# Patient Record
Sex: FEMALE | Race: BLACK OR AFRICAN AMERICAN | NOT HISPANIC OR LATINO | URBAN - METROPOLITAN AREA
[De-identification: names, ages, dates, MRNs, and addresses within clinical notes are randomized per-mention and may not be internally consistent; named-entity substitution may affect disease eponyms.]

---

## 2024-10-01 NOTE — ASU PATIENT PROFILE, ADULT - FALL HARM RISK - UNIVERSAL INTERVENTIONS
Bed in lowest position, wheels locked, appropriate side rails in place/Call bell, personal items and telephone in reach/Instruct patient to call for assistance before getting out of bed or chair/Non-slip footwear when patient is out of bed/[place] to call system/Physically safe environment - no spills, clutter or unnecessary equipment/Purposeful Proactive Rounding/Room/bathroom lighting operational, light cord in reach

## 2024-10-01 NOTE — ASU PATIENT PROFILE, ADULT - NS PREOP UNDERSTANDS INFO
No solid food/dairy/candy/gum after midnight tonight; water allowed before 08:00am tomorrow; patient reminded to come with photo ID/insurance/credit card; dress in comfortable clothes; no jewelries/contact lens/valuable; no smoking/alcohol drinking/recreational drug use today; escort to have photo ID; address and callback number was given/yes

## 2024-10-02 ENCOUNTER — OUTPATIENT (OUTPATIENT)
Dept: OUTPATIENT SERVICES | Facility: HOSPITAL | Age: 39
LOS: 1 days | Discharge: HOME | End: 2024-10-02
Payer: COMMERCIAL

## 2024-10-02 VITALS
WEIGHT: 163.36 LBS | SYSTOLIC BLOOD PRESSURE: 134 MMHG | RESPIRATION RATE: 15 BRPM | HEART RATE: 79 BPM | DIASTOLIC BLOOD PRESSURE: 92 MMHG | HEIGHT: 61 IN | OXYGEN SATURATION: 99 % | TEMPERATURE: 97 F

## 2024-10-02 VITALS
OXYGEN SATURATION: 99 % | HEART RATE: 89 BPM | RESPIRATION RATE: 16 BRPM | DIASTOLIC BLOOD PRESSURE: 75 MMHG | SYSTOLIC BLOOD PRESSURE: 134 MMHG

## 2024-10-02 DIAGNOSIS — D25.9 LEIOMYOMA OF UTERUS, UNSPECIFIED: Chronic | ICD-10-CM

## 2024-10-02 PROCEDURE — 88305 TISSUE EXAM BY PATHOLOGIST: CPT | Mod: 26

## 2024-10-02 PROCEDURE — 19318 BREAST REDUCTION: CPT | Mod: AS,50

## 2024-10-02 RX ORDER — APREPITANT 125MG-80MG
40 KIT ORAL ONCE
Refills: 0 | Status: COMPLETED | OUTPATIENT
Start: 2024-10-02 | End: 2024-10-02

## 2024-10-02 RX ORDER — FENTANYL CITRATE-0.9 % NACL/PF 300MCG/30
50 PATIENT CONTROLLED ANALGESIA VIAL INJECTION
Refills: 0 | Status: DISCONTINUED | OUTPATIENT
Start: 2024-10-02 | End: 2024-10-02

## 2024-10-02 RX ORDER — ACETAMINOPHEN 325 MG
1000 TABLET ORAL ONCE
Refills: 0 | Status: COMPLETED | OUTPATIENT
Start: 2024-10-02 | End: 2024-10-02

## 2024-10-02 RX ORDER — SODIUM CHLORIDE IRRIG SOLUTION 0.9 %
500 SOLUTION, IRRIGATION IRRIGATION ONCE
Refills: 0 | Status: COMPLETED | OUTPATIENT
Start: 2024-10-02 | End: 2024-10-02

## 2024-10-02 RX ORDER — SODIUM CHLORIDE IRRIG SOLUTION 0.9 %
500 SOLUTION, IRRIGATION IRRIGATION
Refills: 0 | Status: DISCONTINUED | OUTPATIENT
Start: 2024-10-02 | End: 2024-10-02

## 2024-10-02 RX ORDER — ONDANSETRON HCL/PF 4 MG/2 ML
4 VIAL (ML) INJECTION ONCE
Refills: 0 | Status: DISCONTINUED | OUTPATIENT
Start: 2024-10-02 | End: 2024-10-02

## 2024-10-02 RX ADMIN — APREPITANT 40 MILLIGRAM(S): KIT at 10:31

## 2024-10-02 RX ADMIN — Medication 400 MILLIGRAM(S): at 17:40

## 2024-10-02 RX ADMIN — Medication 1000 MILLIGRAM(S): at 18:10

## 2024-10-02 RX ADMIN — Medication 2000 MILLILITER(S): at 17:25

## 2024-10-02 NOTE — ASU DISCHARGE PLAN (ADULT/PEDIATRIC) - NS MD DC FALL RISK RISK
For information on Fall & Injury Prevention, visit: [email or web address] OR  [email or web address] OR  https://www.cdc.gov/steadi/patient.html

## 2024-10-02 NOTE — ASU DISCHARGE PLAN (ADULT/PEDIATRIC) - CARE PROVIDER_API CALL
Baljeet Holloway  Plastic Surgery  [address]  [place] [address]  Phone: [phone]  Fax: [phone]  Follow Up Time:

## 2024-10-02 NOTE — BRIEF OPERATIVE NOTE - NSICDXBRIEFPOSTOP_GEN_ALL_CORE_FT
POST-OP DIAGNOSIS:  Hypertrophy of breast 02-Oct-2024 13:35:37  Ping Rudolph  Mastodynia 02-Oct-2024 13:35:45  Ping Rudolph  Acquired deformity of chest and rib 02-Oct-2024 13:35:55  Ping Rudolph

## 2024-10-02 NOTE — BRIEF OPERATIVE NOTE - NSICDXBRIEFPREOP_GEN_ALL_CORE_FT
PRE-OP DIAGNOSIS:  Hypertrophy of breast 02-Oct-2024 13:35:03  Ping Rudolph  Mastodynia 02-Oct-2024 13:35:10  Ping Rudolph  Acquired deformity of chest and rib 02-Oct-2024 13:35:25  Ping Rudolph

## 2024-10-09 LAB — SURGICAL PATHOLOGY STUDY: SIGNIFICANT CHANGE UP

## (undated) DEVICE — DRSG SURGICAL BRA SM 34-36

## (undated) DEVICE — PREP BETADINE KIT

## (undated) DEVICE — STAPLER SKIN MULTI DIRECTION W35

## (undated) DEVICE — DRSG STERISTRIPS 0.5 X 4"

## (undated) DEVICE — PACK BREAST

## (undated) DEVICE — DRSG SURGICAL BRA MED 36-38

## (undated) DEVICE — VENODYNE/SCD SLEEVE CALF MEDIUM

## (undated) DEVICE — Device

## (undated) DEVICE — DRAPE SPLIT SHEET 77" X 108"

## (undated) DEVICE — GLV 7 PROTEXIS (WHITE)

## (undated) DEVICE — SYR LUER LOK 5CC

## (undated) DEVICE — SUT QUILL MONODERM 3-0 14CM 19MM PS-2

## (undated) DEVICE — VENODYNE/SCD SLEEVE CALF LARGE

## (undated) DEVICE — DRAPE TOWEL 1000 SMALL 17" X 11"

## (undated) DEVICE — SUT VICRYL PLUS 2-0 18" TIES UNDYED

## (undated) DEVICE — NDL HYPO REGULAR BEVEL 25G X 1.5" (BLUE)

## (undated) DEVICE — ELCTR PENCIL SMOKE EVACUATOR COATED PUSH BUTTON 70MM

## (undated) DEVICE — SUT QUILL MONODERM 3-0 30CM 19MM

## (undated) DEVICE — STAPLER SKIN VISI-STAT 35 WIDE

## (undated) DEVICE — DRSG KERLIX ROLL 4.5"

## (undated) DEVICE — TUBING MICROAIRE ASPIRATION SET 12FT

## (undated) DEVICE — FOLEY TRAY 16FR 5CC LTX UMETER CLOSED

## (undated) DEVICE — ELCTR STRYKER NEPTUNE BLADE COATED, INSULATED 70MM

## (undated) DEVICE — LAP PAD 18 X 18"

## (undated) DEVICE — TOURNIQUET ESMARK 4"

## (undated) DEVICE — DRSG SURGICAL BRA XL 40-42

## (undated) DEVICE — SUT MONOCRYL 3-0 27" SH

## (undated) DEVICE — SOL IRR POUR NS 0.9% 1000ML

## (undated) DEVICE — WARMING BLANKET LOWER ADULT

## (undated) DEVICE — SUT VICRYL PLUS 1 18" CT-1

## (undated) DEVICE — DRSG ACE BANDAGE 6"

## (undated) DEVICE — DRSG DERMABOND PRINEO 60CM

## (undated) DEVICE — MARKING PEN W RULER

## (undated) DEVICE — STAPLER SKIN INSORB

## (undated) DEVICE — SUT SILK 2-0 30" SH

## (undated) DEVICE — SYR LUER LOK 30CC

## (undated) DEVICE — DRSG DERMABOND 0.7ML

## (undated) DEVICE — WARMING BLANKET FULL UNDERBODY

## (undated) DEVICE — BLADE SCALPEL SAFETYLOCK #15

## (undated) DEVICE — DRSG SURGICAL BRA LG 38-40